# Patient Record
Sex: FEMALE | URBAN - METROPOLITAN AREA
[De-identification: names, ages, dates, MRNs, and addresses within clinical notes are randomized per-mention and may not be internally consistent; named-entity substitution may affect disease eponyms.]

---

## 2024-03-27 ENCOUNTER — LAB (OUTPATIENT)
Dept: URBAN - METROPOLITAN AREA CLINIC 23 | Facility: CLINIC | Age: 24
End: 2024-03-27

## 2024-03-27 ENCOUNTER — OV NP (OUTPATIENT)
Dept: URBAN - METROPOLITAN AREA CLINIC 23 | Facility: CLINIC | Age: 24
End: 2024-03-27
Payer: COMMERCIAL

## 2024-03-27 VITALS
TEMPERATURE: 97.9 F | BODY MASS INDEX: 26.58 KG/M2 | WEIGHT: 150 LBS | HEIGHT: 63 IN | HEART RATE: 64 BPM | DIASTOLIC BLOOD PRESSURE: 68 MMHG | SYSTOLIC BLOOD PRESSURE: 109 MMHG

## 2024-03-27 DIAGNOSIS — K59.00 CONSTIPATION, UNSPECIFIED CONSTIPATION TYPE: ICD-10-CM

## 2024-03-27 DIAGNOSIS — K52.9 PROCTOCOLITIS: ICD-10-CM

## 2024-03-27 DIAGNOSIS — R10.30 LOWER ABDOMINAL PAIN: ICD-10-CM

## 2024-03-27 PROBLEM — 14760008: Status: ACTIVE | Noted: 2024-03-27

## 2024-03-27 PROCEDURE — 99204 OFFICE O/P NEW MOD 45 MIN: CPT | Performed by: NURSE PRACTITIONER

## 2024-03-27 RX ORDER — DOCUSATE SODIUM 100 MG/1
1 CAPSULE AS NEEDED CAPSULE, LIQUID FILLED ORAL ONCE A DAY
Status: ACTIVE | COMMUNITY

## 2024-03-27 NOTE — HPI-TODAY'S VISIT:
Iranian interpretation by Seymour Encinas MA in the office who is fluent in Iranian.   Ms. Beck Briones is a 23 year old female recently seen in the ED 3/15/2024 for bilateral lower abdominal pain with associated constipation. Symptoms started 1 month ago. She reports the pain is severe. Passing small hard nonbloody stools. Associated bloating and mild nausea. Denies fever, chills, vomiting, changes in appetite, changes in weight and rectal pain. No new medications or changes in diet. Labs unremarkable. CT abd/pel with IV contrast with findings of thickening of the rectum and sigmoid colon and moderate amount of feces in the right colon. She was sent home with hyoscyamine and Colace. The hyoscyamine helped initially and now not as much. The colace isn't helping at all. She is not taking any other over the counter laxatives. She changed her diet to eat more bland foods but it did not help. Doesn't feel fully evacuated when she does have a bowel movement. She is concerned about an underlying process and was told she needed a colonoscopy.   No known family history of GI disease or malignancy.  No abdominal surgeries.

## 2024-03-27 NOTE — EXAM-PHYSICAL EXAM
General--no acute distress, well appearing, well nourished Eyes--anicteric, clear conjunctiva HENT--normocephalic, atraumatic head, oropharynx clear Neck--trachea midline Chest--clear to auscultation anteriorly, equal chest rise and fall Heart--regular rate and rhythm Abdomen--soft, mild bilateral lower abdominal tenderness with moderate palpation without guarding, non distended, bowel sounds present Musculoskeletal--normal gait and station Skin--no rashes Neurologic--Alert and oriented x 3 Psychiatric--stable mood, appropriate affect

## 2024-04-02 ENCOUNTER — COLON (OUTPATIENT)
Dept: URBAN - METROPOLITAN AREA SURGERY CENTER 15 | Facility: SURGERY CENTER | Age: 24
End: 2024-04-02
Payer: COMMERCIAL

## 2024-04-02 DIAGNOSIS — K59.09 CONSTIPATION: ICD-10-CM

## 2024-04-02 DIAGNOSIS — R10.31 ABDOMINAL PAIN, RIGHT LOWER QUADRANT: ICD-10-CM

## 2024-04-02 DIAGNOSIS — R10.32 ABDOMINAL PAIN, LEFT LOWER QUADRANT: ICD-10-CM

## 2024-04-02 PROCEDURE — 45378 DIAGNOSTIC COLONOSCOPY: CPT | Performed by: INTERNAL MEDICINE

## 2024-05-23 ENCOUNTER — OFFICE VISIT (OUTPATIENT)
Dept: URBAN - METROPOLITAN AREA CLINIC 23 | Facility: CLINIC | Age: 24
End: 2024-05-23